# Patient Record
(demographics unavailable — no encounter records)

---

## 2017-12-01 NOTE — MMO
BILATERAL SCREENING MAMMOGRAM:

 

Date:  12/01/17 

 

COMPARISON:  

09/07/16, 11/12/12, 10/12/11, and 09/24/10 exams. 

 

HISTORY:  

Annual screening exam. 

 

This patient's mammogram was interpreted with the assistance of computer-aided detection.  

 

FINDINGS:

The breasts are heterogeneously dense. There is asymmetric breast parenchymal change within the upper
 left breast, which is stable. Calcifications seen in the upper outer right breast are also unchanged
 since previous exams. There are also calcifications in the left breast which are stable. 

 

IMPRESSION: 

 

BIRADS 2:  Benign Finding(s) 

 

POS: NAV